# Patient Record
Sex: MALE | Race: WHITE | NOT HISPANIC OR LATINO
[De-identification: names, ages, dates, MRNs, and addresses within clinical notes are randomized per-mention and may not be internally consistent; named-entity substitution may affect disease eponyms.]

---

## 2019-06-27 ENCOUNTER — APPOINTMENT (OUTPATIENT)
Dept: OTOLARYNGOLOGY | Facility: CLINIC | Age: 49
End: 2019-06-27
Payer: COMMERCIAL

## 2019-06-27 PROCEDURE — 99213 OFFICE O/P EST LOW 20 MIN: CPT | Mod: 25

## 2019-06-27 PROCEDURE — 69210 REMOVE IMPACTED EAR WAX UNI: CPT

## 2019-06-27 NOTE — HISTORY OF PRESENT ILLNESS
[de-identified] : Presents for evaluation of his ear stitching him bilaterally. He also complains of congestion and cerumen impaction. He also feels his hearing is not as good as it should be with ambient noise.

## 2019-06-27 NOTE — ASSESSMENT
[FreeTextEntry1] : -Findings were reviewed and discussed with the patient in detail\par -Good aural hygiene reviewed\par -Patient may use wax removal drops as needed\par \par -Avoid noise exposure\par -Audiogram- patient deferred.\par -The patient was asked to call and return urgently if any problems\par -I will see the patient in follow up

## 2019-10-30 ENCOUNTER — APPOINTMENT (OUTPATIENT)
Dept: OTOLARYNGOLOGY | Facility: CLINIC | Age: 49
End: 2019-10-30
Payer: COMMERCIAL

## 2019-10-30 VITALS — SYSTOLIC BLOOD PRESSURE: 114 MMHG | DIASTOLIC BLOOD PRESSURE: 72 MMHG | HEART RATE: 65 BPM

## 2019-10-30 DIAGNOSIS — Z87.09 PERSONAL HISTORY OF OTHER DISEASES OF THE RESPIRATORY SYSTEM: ICD-10-CM

## 2019-10-30 DIAGNOSIS — H90.2 CONDUCTIVE HEARING LOSS, UNSPECIFIED: ICD-10-CM

## 2019-10-30 PROCEDURE — 31231 NASAL ENDOSCOPY DX: CPT

## 2019-10-30 PROCEDURE — 92557 COMPREHENSIVE HEARING TEST: CPT

## 2019-10-30 PROCEDURE — 92567 TYMPANOMETRY: CPT

## 2019-10-30 PROCEDURE — 69210 REMOVE IMPACTED EAR WAX UNI: CPT

## 2019-10-30 PROCEDURE — 99213 OFFICE O/P EST LOW 20 MIN: CPT | Mod: 25

## 2019-10-30 NOTE — ASSESSMENT
[FreeTextEntry1] : Mild otitis externa with granulation tissue at the right meatus. I sprayed boric acid powder and debrided the canal thoroughly.  Did not recommend any drops at this time, but if he fails to improve he will followup and we'll consider a topical cortisone cream or lotion.  \par \par Otherwise discussed oral hygiene and Q-tip avoidance. Followup 6 months\par \par Mild high-frequency sensory neural loss bilaterally. He is not a candidate for augmentation at this time. We will follow him with audiograms every one to 2 years

## 2019-10-30 NOTE — HISTORY OF PRESENT ILLNESS
[de-identified] : Patient previously seen for cerumen impaction. Reports 3 week history of upper respiratory tract infection symptoms and initially left-sided otalgia. He presented to urgent care and was treated with Augmentin for 10 days. The pain and then shifted to the right ear and felt clots. Notes drainage from the right ear, but has been using mineral oil. At this point he has some nasal congestion but not severe. He has been flying frequently

## 2020-12-21 PROBLEM — Z87.09 HISTORY OF UPPER RESPIRATORY INFECTION: Status: RESOLVED | Noted: 2019-10-30 | Resolved: 2020-12-21

## 2021-12-17 ENCOUNTER — NON-APPOINTMENT (OUTPATIENT)
Age: 51
End: 2021-12-17

## 2021-12-20 ENCOUNTER — APPOINTMENT (OUTPATIENT)
Dept: OTOLARYNGOLOGY | Facility: CLINIC | Age: 51
End: 2021-12-20
Payer: COMMERCIAL

## 2021-12-20 VITALS — TEMPERATURE: 97.1 F | HEIGHT: 68 IN | BODY MASS INDEX: 15.16 KG/M2 | WEIGHT: 100 LBS

## 2021-12-20 DIAGNOSIS — H61.20 IMPACTED CERUMEN, UNSPECIFIED EAR: ICD-10-CM

## 2021-12-20 PROCEDURE — 69210 REMOVE IMPACTED EAR WAX UNI: CPT

## 2021-12-20 PROCEDURE — 99212 OFFICE O/P EST SF 10 MIN: CPT | Mod: 25

## 2021-12-20 RX ORDER — PIMECROLIMUS 10 MG/G
1 CREAM TOPICAL
Qty: 30 | Refills: 0 | Status: ACTIVE | COMMUNITY
Start: 2021-05-10

## 2021-12-20 RX ORDER — IVERMECTIN 10 MG/G
1 CREAM TOPICAL
Qty: 45 | Refills: 0 | Status: ACTIVE | COMMUNITY
Start: 2021-05-10

## 2021-12-20 NOTE — HISTORY OF PRESENT ILLNESS
[de-identified] : History of recurrent cerumen impactions.  Patient tells me his ears have felt clogged recently.  Denies otalgia, otorrhea, tinnitus, or vertigo.  Patient has no previous otologic history or acoustic trauma.\par \par

## 2022-02-28 ENCOUNTER — APPOINTMENT (OUTPATIENT)
Dept: OTOLARYNGOLOGY | Facility: CLINIC | Age: 52
End: 2022-02-28
Payer: COMMERCIAL

## 2022-02-28 VITALS — TEMPERATURE: 96 F | BODY MASS INDEX: 23.79 KG/M2 | WEIGHT: 157 LBS | HEIGHT: 68 IN

## 2022-02-28 DIAGNOSIS — R07.0 PAIN IN THROAT: ICD-10-CM

## 2022-02-28 PROCEDURE — 31575 DIAGNOSTIC LARYNGOSCOPY: CPT

## 2022-02-28 PROCEDURE — 99214 OFFICE O/P EST MOD 30 MIN: CPT | Mod: 25

## 2022-02-28 NOTE — PROCEDURE
[de-identified] : PROCEDURE: Flexible laryngoscopy\par SURGEON: Dr. Kramer\par INDICATIONS: He was unable to tolerate a mirror exam. Assess for laryngopharynx pharyngeal reflux. cough. head and neck mass. \par ANESTHESIA: The patient was placed in a sitting position.  Following application of the topical anesthetic and decongestant, exam was performed with a flexible scope.  The scope was passed along the right nasal floor to the nasopharynx.  It was then passed into the region of the middle meatus, middle turbinate, and sphenoethmoid region.  An identical procedure was performed on the left side.  The following findings were noted:\par \par The nasal musoca was healthy appearing and the septum was roughly midline. The middle meatus and sphenoethmoid recesses were clear bilaterally. The nasopharynx \par \par Nasopharynx: no masses, choanae patent, no adenoid tissue\par \par Base of tongue/vallecula: no masses or asymmetry\par Pharyngeal walls: symmetrical. No masses.\par Pyriform sinuses: no lesions or pooling of secretions.\par Epiglottis: normal. No edema or lesions.\par Aryepiglottic folds: normal. No lesions. \par Vocal cords: clear and mobile. No lesions. Airway patent.\par Arytenoids: no edema or erythema. \par Interarytenoid area: no edema, erythema or lesion.\par  \par Diffuse laryngeal evidence of reflux.  No evidence of lesion or infection.

## 2022-02-28 NOTE — HISTORY OF PRESENT ILLNESS
[de-identified] : Patient known to the practice.\par He generally is evaluated for recurrent cerumen impaction.\par Today he presents for evaluation of "sore throat about 7 weeks".\par He reports that the problem initially developed spontaneously on the left side.  It then switched to the right side.  He now reports that it is both sides.  He believes that he has had a long history of reflux because of a long history of burping.  He has never had a formal evaluation.  He did have colonoscopy in the past because his mother had a history of polyps.

## 2022-02-28 NOTE — ASSESSMENT
[FreeTextEntry1] : I have reassured him that there is no infection or lesion.\par My working diagnosis is LPR.\par Patient education material was provided.\par He is not motivated to start treatment at this time.\par He does report in the last few days he is feeling slightly better.\par I referred him for further GI work-up for some of his esophageal complaints.

## 2022-11-23 ENCOUNTER — FORM ENCOUNTER (OUTPATIENT)
Age: 52
End: 2022-11-23

## 2022-11-23 ENCOUNTER — APPOINTMENT (OUTPATIENT)
Dept: ORTHOPEDIC SURGERY | Facility: CLINIC | Age: 52
End: 2022-11-23

## 2022-11-23 VITALS — BODY MASS INDEX: 23.79 KG/M2 | WEIGHT: 157 LBS | HEIGHT: 68 IN

## 2022-11-23 DIAGNOSIS — S83.91XA SPRAIN OF UNSPECIFIED SITE OF RIGHT KNEE, INITIAL ENCOUNTER: ICD-10-CM

## 2022-11-23 DIAGNOSIS — Z78.9 OTHER SPECIFIED HEALTH STATUS: ICD-10-CM

## 2022-11-23 DIAGNOSIS — M25.461 EFFUSION, RIGHT KNEE: ICD-10-CM

## 2022-11-23 PROCEDURE — 99204 OFFICE O/P NEW MOD 45 MIN: CPT

## 2022-11-23 PROCEDURE — 73564 X-RAY EXAM KNEE 4 OR MORE: CPT | Mod: RT

## 2022-11-23 RX ORDER — MELOXICAM 15 MG/1
15 TABLET ORAL
Qty: 30 | Refills: 0 | Status: COMPLETED | COMMUNITY
Start: 2022-11-23 | End: 2022-12-23

## 2022-11-23 NOTE — ASSESSMENT
[FreeTextEntry1] : Underlying pathology and treatment options reviewed.\par 11/23/2022 xrays of the right knee, 4 views, negative. \par Mri right knee r/o tears vs occult fx\par would hold on aspiration due to timing of injury and quality of symptoms. \par No bracing recommended unless there is buckling. \par questions answered. \par activity modifier as tolerated\par Prescribed Mobic to reduce effusion\par \par The documentation recorded by the scribe accurately reflects the service I personally performed and the decisions made by me.\par I, Lizz Gonzalezibmoises, attest that this documentation has been prepared under the direction and in the presence of Provider Carlos Wesley MD.\par \par The patient was seen by Carlos Wesley MD.\par The following radiographs were ordered and read by me during this patient's visit. I reviewed each radiograph in detail with the patient, and discussed the findings as highlighted below.\par \par

## 2022-11-23 NOTE — HISTORY OF PRESENT ILLNESS
[5] : 5 [1] : 2 [Dull/Aching] : dull/aching [Localized] : localized [Sharp] : sharp [Throbbing] : throbbing [Intermittent] : intermittent [Nothing helps with pain getting better] : Nothing helps with pain getting better [Full time] : Work status: full time [de-identified] : 11/23/2022 :KARIS SCHNEIDER , a 52 year old male, presents today for right knee ongoing pain for 6 months, worst since Sunday after playing football after fall. Reports if he moves his knee a certain way there can be severe pain. Denies pain while resting. Tried OTC meds. Denies hx. \par  [] : no

## 2022-11-23 NOTE — PHYSICAL EXAM
[Right] : right knee [NL (0)] : extension 0 degrees [Equivocal] : equivocal Raquel [] : patient ambulates without assistive device [TWNoteComboBox7] : flexion 120 degrees

## 2022-11-24 ENCOUNTER — TRANSCRIPTION ENCOUNTER (OUTPATIENT)
Age: 52
End: 2022-11-24

## 2022-11-24 ENCOUNTER — APPOINTMENT (OUTPATIENT)
Dept: MRI IMAGING | Facility: CLINIC | Age: 52
End: 2022-11-24

## 2022-11-24 PROCEDURE — 73721 MRI JNT OF LWR EXTRE W/O DYE: CPT | Mod: RT

## 2022-12-01 ENCOUNTER — APPOINTMENT (OUTPATIENT)
Dept: ORTHOPEDIC SURGERY | Facility: CLINIC | Age: 52
End: 2022-12-01

## 2022-12-01 VITALS — HEIGHT: 68 IN | WEIGHT: 157 LBS | BODY MASS INDEX: 23.79 KG/M2

## 2022-12-01 DIAGNOSIS — M17.10 UNILATERAL PRIMARY OSTEOARTHRITIS, UNSPECIFIED KNEE: ICD-10-CM

## 2022-12-01 DIAGNOSIS — M66.18 RUPTURE OF SYNOVIUM, OTHER SITE: ICD-10-CM

## 2022-12-01 PROCEDURE — 99213 OFFICE O/P EST LOW 20 MIN: CPT

## 2022-12-01 NOTE — ASSESSMENT
[FreeTextEntry1] : Underlying pathology and treatment options reviewed.\par 11/23/2022 xrays of the right knee, 4 views, negative. \par Mri right knee r/o tears vs occult fx\par would hold on aspiration due to timing of injury and quality of symptoms. \par Prescribed Mobic to reduce effusion\par \par \par 12/01/2022: MRI reviewed and discussed.\par Pathology of the PF OA reviewed. \par consider motrin prior to sports and ice therapy after. \par consider bracing if pivoting is offensive.\par Questions addressed.\par Activity modifier as tolerated.\par \par The documentation recorded by the scribe accurately reflects the service I personally performed and the decisions made by me.\par I, Lizz Gonzalezibe, attest that this documentation has been prepared under the direction and in the presence of Provider Carlos Wesley MD.\par \par The patient was seen by Carlos Wesley MD.\par The following radiographs were ordered and read by me during this patient's visit. I reviewed each radiograph in detail with the patient, and discussed the findings as highlighted below.\par \par

## 2022-12-01 NOTE — HISTORY OF PRESENT ILLNESS
[5] : 5 [Dull/Aching] : dull/aching [Localized] : localized [Sharp] : sharp [Throbbing] : throbbing [Intermittent] : intermittent [Nothing helps with pain getting better] : Nothing helps with pain getting better [Full time] : Work status: full time [0] : 0 [Leisure] : leisure [Rest] : rest [Meds] : meds [de-identified] : 12/01/2022 Here for MRI review, took mobic 5xd. \par \par 11/23/2022 :KARIS SCHNEIDER , a 52 year old male, presents today for right knee ongoing pain for 6 months, worst since Sunday after playing football after fall. Reports if he moves his knee a certain way there can be severe pain. Denies pain while resting. Tried OTC meds. Denies hx. \par  [] : no [FreeTextEntry1] : right knee [FreeTextEntry5] : Patient is here today for MRI results of right knee. Patient states that the pain has improved since last visit. Patient has been taking meloxicam.  [de-identified] : quick movements/turns [de-identified] : meloxicam

## 2022-12-01 NOTE — PHYSICAL EXAM
[Right] : right knee [NL (0)] : extension 0 degrees [Equivocal] : equivocal Raquel [] : patient ambulates without assistive device [TWNoteComboBox7] : flexion 130 degrees

## 2023-01-05 ENCOUNTER — TRANSCRIPTION ENCOUNTER (OUTPATIENT)
Age: 53
End: 2023-01-05

## 2023-09-13 ENCOUNTER — APPOINTMENT (OUTPATIENT)
Dept: ORTHOPEDIC SURGERY | Facility: CLINIC | Age: 53
End: 2023-09-13
Payer: COMMERCIAL

## 2023-09-13 VITALS — WEIGHT: 160 LBS | BODY MASS INDEX: 24.25 KG/M2 | HEIGHT: 68 IN

## 2023-09-13 DIAGNOSIS — S70.02XA CONTUSION OF LEFT HIP, INITIAL ENCOUNTER: ICD-10-CM

## 2023-09-13 PROCEDURE — 99213 OFFICE O/P EST LOW 20 MIN: CPT

## 2023-09-13 PROCEDURE — 73502 X-RAY EXAM HIP UNI 2-3 VIEWS: CPT

## 2023-10-16 ENCOUNTER — APPOINTMENT (OUTPATIENT)
Dept: MRI IMAGING | Facility: CLINIC | Age: 53
End: 2023-10-16
Payer: COMMERCIAL

## 2023-10-16 PROCEDURE — 73721 MRI JNT OF LWR EXTRE W/O DYE: CPT | Mod: LT

## 2024-03-14 ENCOUNTER — NON-APPOINTMENT (OUTPATIENT)
Age: 54
End: 2024-03-14

## 2024-03-15 ENCOUNTER — APPOINTMENT (OUTPATIENT)
Dept: OTOLARYNGOLOGY | Facility: CLINIC | Age: 54
End: 2024-03-15
Payer: COMMERCIAL

## 2024-03-15 VITALS
DIASTOLIC BLOOD PRESSURE: 73 MMHG | SYSTOLIC BLOOD PRESSURE: 125 MMHG | HEART RATE: 80 BPM | BODY MASS INDEX: 24.25 KG/M2 | HEIGHT: 68 IN | WEIGHT: 160 LBS

## 2024-03-15 DIAGNOSIS — H61.23 IMPACTED CERUMEN, BILATERAL: ICD-10-CM

## 2024-03-15 PROCEDURE — 99213 OFFICE O/P EST LOW 20 MIN: CPT | Mod: 25

## 2024-03-15 RX ORDER — ROSUVASTATIN CALCIUM 5 MG/1
TABLET, FILM COATED ORAL
Refills: 0 | Status: ACTIVE | COMMUNITY

## 2024-03-15 RX ORDER — DOXYCLYCLINE HYCLATE 150 MG/1
TABLET, COATED ORAL
Refills: 0 | Status: ACTIVE | COMMUNITY

## 2024-03-15 NOTE — HISTORY OF PRESENT ILLNESS
[de-identified] : Archie Sams is a 53 yo male who presents for evaluation of bilateral aural fullness. He notes that the right ear became completely clogged a few days ago. He notes bilateral soft tinnitus over the past few days. He denies sudden hearing change .He denies otalgia, otorrhea, vertigo, fevers, chills. He denies history of recurrent ear infections. Previous audio showed sensorineural hearing loss. His mother has hearing loss. He denies significant loud noise exposure or exposure to ototoxic medications. No head trauma.

## 2024-03-15 NOTE — ASSESSMENT
[FreeTextEntry1] : Archie Sams presents for evaluation of bilateral aural fullness and diminished hearing. Significant bilateral cerumen impaction was removed and otoscopic exam was otherwise normal. He notes return of hearing to baseline. He had previous audiogram showing bilateral hearing loss. Advised strongly for audiogram today but he does not have time. He will return for audiogram.  - Follow up for audio.

## 2024-03-22 ENCOUNTER — APPOINTMENT (OUTPATIENT)
Dept: OTOLARYNGOLOGY | Facility: CLINIC | Age: 54
End: 2024-03-22
Payer: COMMERCIAL

## 2024-03-22 PROCEDURE — 92567 TYMPANOMETRY: CPT

## 2024-03-22 PROCEDURE — 92557 COMPREHENSIVE HEARING TEST: CPT

## 2024-03-25 ENCOUNTER — NON-APPOINTMENT (OUTPATIENT)
Age: 54
End: 2024-03-25

## 2024-04-09 ENCOUNTER — APPOINTMENT (OUTPATIENT)
Dept: OTOLARYNGOLOGY | Facility: CLINIC | Age: 54
End: 2024-04-09
Payer: COMMERCIAL

## 2024-04-09 DIAGNOSIS — H93.8X3 OTHER SPECIFIED DISORDERS OF EAR, BILATERAL: ICD-10-CM

## 2024-04-09 DIAGNOSIS — H93.293 OTHER ABNORMAL AUDITORY PERCEPTIONS, BILATERAL: ICD-10-CM

## 2024-04-09 DIAGNOSIS — H90.3 SENSORINEURAL HEARING LOSS, BILATERAL: ICD-10-CM

## 2024-04-09 DIAGNOSIS — H60.391 OTHER INFECTIVE OTITIS EXTERNA, RIGHT EAR: ICD-10-CM

## 2024-04-09 DIAGNOSIS — K21.9 GASTRO-ESOPHAGEAL REFLUX DISEASE W/OUT ESOPHAGITIS: ICD-10-CM

## 2024-04-09 DIAGNOSIS — H93.13 TINNITUS, BILATERAL: ICD-10-CM

## 2024-04-09 PROCEDURE — 99214 OFFICE O/P EST MOD 30 MIN: CPT

## 2024-04-09 RX ORDER — OFLOXACIN OTIC 3 MG/ML
0.3 SOLUTION AURICULAR (OTIC) TWICE DAILY
Qty: 1 | Refills: 0 | Status: ACTIVE | COMMUNITY
Start: 2024-04-09 | End: 1900-01-01

## 2024-04-09 NOTE — HISTORY OF PRESENT ILLNESS
[de-identified] : KARIS SCHNEIDER is a 54 year old patient Here for ear evaluation.  He has had a 6-week history of clogging of his ears.  He also had a heavy head initially.  He saw Dr. Castro for ENT evaluation and had his ears cleaned.  He has had tinnitus.  He has no otalgia, otorrhea, or dizziness.  He does not have significant nasal obstruction or congestion.  He has a history of reflux but it has not been bad.  He does not feel sick.  He does not have a history of migraines.  He could have TMJ dysfunction.  He was on low-dose doxycycline.  He stopped it to see if it would improve the tinnitus.  He has not noticed a change.    No history of recurrent ear infections or prior otologic surgery He does not smoke tobacco but does smoke marijuana occasionally 3/22/24- Audiogram shows a bilateral high-frequency sensorineural hearing loss with type a tympanograms.  Word recognition was 96%

## 2024-04-09 NOTE — ASSESSMENT
[FreeTextEntry1] : He has a 6-week history of ear fullness/pressure and tinnitus.  On exam today he has a right otitis externa.  Audiogram in March showed a bilateral high-frequency sensorineural hearing loss with normal tympanograms.  I discussed possible etiologies of the tinnitus and ear pressure.  The tinnitus is related to the hearing loss.  We discussed possible eustachian tube dysfunction, allergy/sinus disease, reflux, TMJ dysfunction, musculoskeletal sources and atypical migraines as causes of ear pressure.  Plan -Findings and management options were discussed with the patient. - Good ear hygiene and dry ear precautions.  He was given earplugs - I am placing him on Floxin eardrops for 1 week - Repeat audiogram when he returns - He is flying on Sunday.  I recommended Afrin prior to takeoff and landing as well as a decongestant.  He should also Valsalva - He may try a nasal steroid spray and/or antihistamine to see if that helps - I spoke about further workup including flexible laryngoscopy and nasal endoscopy.  He is going to think this over and consider it at his next visit if he is not improving - If he continues to have symptoms, I recommend dental evaluation to out TMJ dysfunction and possible neurology evaluation.  We would also discussed obtaining imaging study - I will give him literature regarding tinnitus - I will see him back in approximately 1 week - He should call return earlier if he has any concerns or worsening symptoms

## 2024-04-09 NOTE — PHYSICAL EXAM
[TextEntry] : PHYSICAL EXAM  General: The patient was alert, oriented and in no distress. Voice was clear.  Face: The patient had no facial asymmetry or mass. The skin was unremarkable.  Ears: Hearing normal to conversational voice External ears were normal without deformity. Ear canals: Left ear-clear. No cerumen or inflammation Tympanic membranes: Left ear-intact and normal. No perforation or effusion. mobile  Ear canals- see microscopy  Procedure: Microscopic Ear Exam The patient was positioned comfortably.  The microscope was used.  Right ear: Ear canal-mild inflammation and drainage.  The ear was suctioned Tympanic membrane-not completely visualized but appeared intact   Nose:  The external nose had no significant deformity.  There is no facial tenderness. On anterior rhinoscopy, the nasal mucosa was [clear].  The anterior septum was grossly midline. There were no visualized polyps, purulence  or masses.   Oral cavity: Oral mucosa- normal. Oral and base of tongue- clear and without mass. Gingival and buccal mucosa- moist and without lesions. Palate- the palate moved well. There was no cleft palate. There appeared to be good salivary flow.   Oral cavity/oropharynx- no pus, erythema or mass  Neck:  The neck was symmetrical. The parotid and submandibular glands were normal without masses. The trachea was midline and there was no unusual crepitus. Thyroid was smooth and nontender and no masses were palpated. No masses  Lymphatics: Cervical adenopathy- none.

## 2025-04-21 ENCOUNTER — NON-APPOINTMENT (OUTPATIENT)
Age: 55
End: 2025-04-21

## 2025-04-21 ENCOUNTER — APPOINTMENT (OUTPATIENT)
Dept: OTOLARYNGOLOGY | Facility: CLINIC | Age: 55
End: 2025-04-21
Payer: COMMERCIAL

## 2025-04-21 VITALS — BODY MASS INDEX: 24.25 KG/M2 | HEIGHT: 68 IN | WEIGHT: 160 LBS

## 2025-04-21 DIAGNOSIS — H90.3 SENSORINEURAL HEARING LOSS, BILATERAL: ICD-10-CM

## 2025-04-21 DIAGNOSIS — H61.23 IMPACTED CERUMEN, BILATERAL: ICD-10-CM

## 2025-04-21 DIAGNOSIS — H60.8X3 OTHER OTITIS EXTERNA, BILATERAL: ICD-10-CM

## 2025-04-21 PROCEDURE — 99213 OFFICE O/P EST LOW 20 MIN: CPT | Mod: 25

## 2025-04-21 RX ORDER — FLUOCINOLONE ACETONIDE 0.11 MG/ML
0.01 OIL AURICULAR (OTIC)
Qty: 1 | Refills: 3 | Status: ACTIVE | COMMUNITY
Start: 2025-04-21 | End: 1900-01-01